# Patient Record
Sex: FEMALE | Race: WHITE | Employment: FULL TIME | URBAN - METROPOLITAN AREA
[De-identification: names, ages, dates, MRNs, and addresses within clinical notes are randomized per-mention and may not be internally consistent; named-entity substitution may affect disease eponyms.]

---

## 2020-04-19 ENCOUNTER — HOSPITAL ENCOUNTER (EMERGENCY)
Age: 30
Discharge: HOME | End: 2020-04-19
Attending: EMERGENCY MEDICINE
Payer: OTHER GOVERNMENT

## 2020-04-19 ENCOUNTER — APPOINTMENT (OUTPATIENT)
Dept: GENERAL RADIOLOGY | Age: 30
End: 2020-04-19
Payer: OTHER GOVERNMENT

## 2020-04-19 VITALS
TEMPERATURE: 98.3 F | HEIGHT: 63 IN | BODY MASS INDEX: 30.12 KG/M2 | RESPIRATION RATE: 16 BRPM | DIASTOLIC BLOOD PRESSURE: 79 MMHG | WEIGHT: 170 LBS | OXYGEN SATURATION: 98 % | HEART RATE: 83 BPM | SYSTOLIC BLOOD PRESSURE: 112 MMHG

## 2020-04-19 PROCEDURE — 99283 EMERGENCY DEPT VISIT LOW MDM: CPT

## 2020-04-19 PROCEDURE — 4500000028 HC INTERMEDIATE PROCEDURE

## 2020-04-19 PROCEDURE — 73070 X-RAY EXAM OF ELBOW: CPT

## 2020-04-19 SDOH — HEALTH STABILITY: MENTAL HEALTH: HOW OFTEN DO YOU HAVE A DRINK CONTAINING ALCOHOL?: NEVER

## 2020-04-19 ASSESSMENT — PAIN DESCRIPTION - LOCATION: LOCATION: ARM

## 2020-04-19 ASSESSMENT — PAIN DESCRIPTION - PAIN TYPE: TYPE: ACUTE PAIN

## 2020-04-19 ASSESSMENT — PAIN DESCRIPTION - FREQUENCY: FREQUENCY: CONTINUOUS

## 2020-04-19 ASSESSMENT — PAIN SCALES - GENERAL: PAINLEVEL_OUTOF10: 3

## 2020-04-19 ASSESSMENT — PAIN DESCRIPTION - ORIENTATION: ORIENTATION: LEFT

## 2020-04-19 NOTE — ED PROVIDER NOTES
Stress: None   Relationships    Social connections     Talks on phone: None     Gets together: None     Attends Religion service: None     Active member of club or organization: None     Attends meetings of clubs or organizations: None     Relationship status: None    Intimate partner violence     Fear of current or ex partner: None     Emotionally abused: None     Physically abused: None     Forced sexual activity: None   Other Topics Concern    None   Social History Narrative    None     History reviewed. No pertinent family history. PHYSICAL EXAM    VITAL SIGNS: /79   Pulse 83   Temp 98.3 °F (36.8 °C) (Oral)   Resp 16   Ht 5' 3\" (1.6 m)   Wt 170 lb (77.1 kg)   BMI 30.11 kg/m²   Constitutional:  Well developed, well nourished, no acute distress, non-toxic appearance   HENT:  Atraumatic, normocephalic  Musculoskeletal:   Mild tenderness to palpation of the left upper forearm, lower portion of the elbow. No tenderness of the wrist, hand, upper arm. Neurovascularly intact. Remaining exam reveals active ROM of  joints without ligamentous laxity. Theres no obvious joint or bony deformity. Abdomen: Soft nontender. Integument:  Well hydrated, no rash. Skin intact  Neurologic:  Awake alert, normal flow of speech. No focal deficits noted. Psychiatric: Cooperative, pleasant affect    RADIOLOGY/PROCEDURES    Xr Elbow Left (2 Views)    Result Date: 4/19/2020  EXAMINATION: TWO XRAY VIEWS OF THE LEFT ELBOW 4/19/2020 3:06 pm COMPARISON: None HISTORY: ORDERING SYSTEM PROVIDED HISTORY: left upper forearm, elbow pain TECHNOLOGIST PROVIDED HISTORY: Reason for exam:->left upper forearm, elbow pain Reason for Exam: left upper forearm, elbow pain Acuity: Acute Type of Exam: Initial Mechanism of Injury: states she injured her arm falling while roller skating with her kids around 1300 today, reports hearing a pop.  Relevant Medical/Surgical History: pt had difficulty moving arm for xrays d/t too much pain

## 2020-04-19 NOTE — ED NOTES
Splint placed on left arm. Patient PMS present distal to splinting.   Patient splint inspected by Dr. Sol Smith, RN  04/19/20 [id number]

## 2020-04-22 ENCOUNTER — OFFICE VISIT (OUTPATIENT)
Dept: ORTHOPEDIC SURGERY | Age: 30
End: 2020-04-22
Payer: OTHER GOVERNMENT

## 2020-04-22 VITALS
HEART RATE: 84 BPM | RESPIRATION RATE: 16 BRPM | OXYGEN SATURATION: 99 % | BODY MASS INDEX: 32.32 KG/M2 | HEIGHT: 63 IN | WEIGHT: 182.4 LBS

## 2020-04-22 PROCEDURE — 24650 CLTX RDL HEAD/NCK FX WO MNPJ: CPT | Performed by: ORTHOPAEDIC SURGERY

## 2020-04-22 PROCEDURE — 99203 OFFICE O/P NEW LOW 30 MIN: CPT | Performed by: ORTHOPAEDIC SURGERY

## 2020-04-22 RX ORDER — ACETAMINOPHEN 325 MG/1
650 TABLET ORAL EVERY 6 HOURS PRN
COMMUNITY

## 2020-04-22 RX ORDER — METHOCARBAMOL 500 MG/1
500 TABLET, FILM COATED ORAL 3 TIMES DAILY
COMMUNITY

## 2020-04-22 RX ORDER — NAPROXEN 500 MG/1
TABLET ORAL
COMMUNITY
Start: 2020-03-10

## 2020-04-22 ASSESSMENT — ENCOUNTER SYMPTOMS
SHORTNESS OF BREATH: 0
COLOR CHANGE: 0

## 2020-04-22 NOTE — PROGRESS NOTES
Subjective:      Patient ID: Candy Narvaez is a 30 y.o. female. Patient is a 30year old female that presents in office as an ED follow up from [place] to evaluate patient after patient reportedly fell onto her left arm while rollerskating landing with her arm underneath her. X-rays taken at the ED were positive for a nondisplaced impacted radial head fracture with elbow joint effusion. Pain is having an aching pain sometimes throbbing pain along the radial aspect of the forearm with pain radiating to the ulnar aspect of the arm down to pinky and ring finger with pain worse at night. Rated at 2/10. Not specifically having pain within the elbow, but will occasionally have pain above and below the elbow. Remains in sling and splint since leaving ED and currently taking Acetaminophen with little relief. Denies previous injuries, surgeries, or conservative therapies. XR ELBOW LEFT (2 VIEWS)    Impression  Nondisplaced impacted radial head fracture with an elbow joint effusion. She comes in today for her first visit with me in regards to her left elbow injury. She states that while she was rollerskating she fell landing directly onto her left arm. Since that time she is continued to have a mild, dull and aching pain in the lateral aspect of her left elbow and left wrist.  She has kept her splint on and has remained nonweightbearing with the left arm. Patient denies any prior injury to the involved extremity/ joint, denies numbness or tingling in the involved extremity and denies fever or chills. Review of Systems   Constitutional: Negative for activity change, chills and fever. Respiratory: Negative for shortness of breath. Cardiovascular: Negative for chest pain. Musculoskeletal: Positive for arthralgias, joint swelling and myalgias. Negative for gait problem. Skin: Negative for color change, pallor, rash and wound. Neurological: Positive for weakness. Negative for numbness.        Past Medical

## 2025-07-22 RX ORDER — BUSPIRONE HYDROCHLORIDE 30 MG/1
30 TABLET ORAL DAILY
COMMUNITY